# Patient Record
(demographics unavailable — no encounter records)

---

## 2025-06-13 NOTE — IMAGING
[de-identified] : Left shoulder: Positive tenderness over the anterior aspect, negative Neer's test, pain with internal rotation of the shoulder however range of motion is full, negative Luce's test, 5 out of 5 strength.  X-ray left shoulder 3 views: No fracture or bony abnormalities noted.

## 2025-06-13 NOTE — HISTORY OF PRESENT ILLNESS
[de-identified] : Mr. Recinos is a 48 year old RHD male who presents to the walk in for evaluation of left shoulder pain status post injury that occurred 12 days ago.  He initially thought it was just his neck that was bothering him as he states he was running into home plate stumbled and the backstop was extremely close causing him to hit into the fence breaking teeth as well as jerking his neck forward and backward.  He states he started to feel better however this week the shoulder has become more painful and stiff.  He states certain range of motion he feels a shooting pain however denies any loss of motion.  He has been taking Motrin with some improvement of the pain he is experiencing.  He denies any radicular component.

## 2025-06-13 NOTE — ASSESSMENT
[FreeTextEntry1] : 48-year-old male with left shoulder rotator cuff injury.  I have offered him a cortisone injection which is detailed below as well as exercises from the [employer] website to perform and to improve his pain and overall function.  He will continue to use over-the-counter medication as needed and will follow-up in 6 weeks for reassessment.  Patient is aware if there is any issue in contact the office they verbalized understanding and agreement.

## 2025-06-13 NOTE — PROCEDURE
[Large Joint Injection] : Large joint injection [Left] : of the left [Shoulder] : shoulder [Pain] : pain [Alcohol] : alcohol [Ethyl Chloride sprayed topically] : ethyl chloride sprayed topically [___ cc    1%] : Lidocaine ~Vcc of 1%  [____] : [unfilled] [] : Patient tolerated procedure well [Call if redness, pain or fever occur] : call if redness, pain or fever occur [Risks, benefits, alternatives discussed / Verbal consent obtained] : the risks benefits, and alternatives have been discussed, and verbal consent was obtained